# Patient Record
Sex: MALE | Race: WHITE | Employment: STUDENT
[De-identification: names, ages, dates, MRNs, and addresses within clinical notes are randomized per-mention and may not be internally consistent; named-entity substitution may affect disease eponyms.]

---

## 2018-05-07 ENCOUNTER — HEALTH MAINTENANCE LETTER (OUTPATIENT)
Age: 23
End: 2018-05-07

## 2018-05-28 ENCOUNTER — HEALTH MAINTENANCE LETTER (OUTPATIENT)
Age: 23
End: 2018-05-28

## 2019-05-22 ENCOUNTER — TRANSFERRED RECORDS (OUTPATIENT)
Dept: HEALTH INFORMATION MANAGEMENT | Facility: CLINIC | Age: 24
End: 2019-05-22

## 2019-06-17 NOTE — PATIENT INSTRUCTIONS
FREE AND CONFIDENTIAL 24/7 MENTAL HEALTH OR CRISIS HOTLINES:  BY COUNTY you live in:      [place]    Adult  [phone]                     Child   [phone]      [facility] [facility] [phone]                                             Child [phone]    [place]             Adult [phone]                                            Child [phone]        Preventive Health Recommendations  Male Ages 21 - 25     Yearly exam:             See your health care provider every year in order to  o   Review health changes.   o   Discuss preventive care.    o   Review your medicines if your doctor has prescribed any.    You should be tested each year for STDs (sexually transmitted diseases).     Talk to your provider about cholesterol testing.      If you are at risk for diabetes, you should have a diabetes test (fasting glucose).    Shots: Get a flu shot each year. Get a tetanus shot every 10 years.     Nutrition:    Eat at least 5 servings of fruits and vegetables daily.     Eat whole-grain bread, whole-wheat pasta and brown rice instead of white grains and rice.     Get adequate calcium and Vitamin D.     Lifestyle    Exercise for at least 150 minutes a week (30 minutes a day, 5 days a week). This will help you control your weight and prevent disease.     Limit alcohol to one drink per day.     No smoking.     Wear sunscreen to prevent skin cancer.     See your dentist every six months for an exam and cleaning.

## 2019-06-17 NOTE — PROGRESS NOTES
Subjective     Curly Leslie is a 24 year old male who presents to clinic today for the following health issues:    HPI     New Patient/Transfer of Care    Discuss mental health issue and establish care, would like to have meds check and taken over. Pt was @ [facility] in [place].      Has been taking zoloft 150 mg, propanolol 60 mg (recently increased from 40 mg), and ativan 0.5 mg[place] of the ativan in October and June 3rd of this year. No concerns seen.     Missed his medications for awhile. Went through a bad breakup recently so scores are higher per patient. Before this breakup he feels the medications were helping.  No side effects.   He has been on 150 mg zoloft only recently not enough time for it to kick in.   Denies suicidal or homicidal thoughts.  Patient instructed to go to the emergency room or call 911 if these occur.  He has had thoughts of being better off not here per patient but no thoughts of actually harming himself per patient. No active plan either.     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 Moving back here.   Is job hunting. Lives with parents.   Was diagnosed with anxiety about a year ago and also depression.  Saw a therapist briefly. Would like to establish here.                   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      Chua's nevus     Acne vulgaris     Mood disorder (H)     Anxiety     BMI 35.0-35.9,adult     Past Surgical History:   Procedure Laterality Date     NO HISTORY OF SURGERY         Social History     Tobacco Use     Smoking status: Never Smoker     Smokeless tobacco: Never Used     Tobacco comment: no smoker in the household   Substance Use Topics     Alcohol use: No     Family History   Problem Relation Age of Onset     Diabetes Mother      Diabetes Maternal Grandmother      Hypertension Maternal Grandmother       "Diabetes Maternal Grandfather      Hypertension Maternal Grandfather          Current Outpatient Medications   Medication Sig Dispense Refill     LORazepam (ATIVAN) 0.5 MG tablet TAKE 1 TAB BY MOUTH DAILY AS NEEDED FOR SEVERE ANXIETY  0     propranolol (INDERAL) 60 MG tablet TAKE 1 TAB BY MOUTH TWICE DAILY AS NEEDED FOR ANXIETY.  1     sertraline (ZOLOFT) 100 MG tablet TAKE 1.5 TABLETS BY MOUTH 1 TIME A DAY FOR ANXIETY.  1     Allergies   Allergen Reactions     Nkda [No Known Drug Allergies]          Reviewed and updated as needed this visit by Provider         Review of Systems   ROS COMP: Constitutional, HEENT, cardiovascular, pulmonary, GI, , musculoskeletal, neuro, skin, endocrine and psych systems are negative, except as otherwise noted.      Objective    /86   Pulse 88   Temp 98.9  F (37.2  C) (Oral)   Resp 16   Ht 1.778 m (5' 10\")   Wt 113.4 kg (250 lb)   SpO2 100%   BMI 35.87 kg/m    Body mass index is 35.87 kg/m .  Physical Exam   GENERAL: alert, no distress and obese  NECK: no adenopathy, no asymmetry, masses, or scars and thyroid normal to palpation  RESP: lungs clear to auscultation - no rales, rhonchi or wheezes  CV: regular rate and rhythm, normal S1 S2, no S3 or S4, no murmur, click or rub, no peripheral edema and peripheral pulses strong  MS: no gross musculoskeletal defects noted, no edema  NEURO: Normal strength and tone, mentation intact and speech normal  PSYCH: mentation appears normal, affect normal/bright            Assessment & Plan     1. Mood disorder (H)  Worsening, needs to establish with therapist which he would like, to help him through this life change  Is already on higher zoloft dose, will give this more time  Uses ativan appropriately/rarely  Doesn't need any refills today. Will come back in before needing more refills of ativan   if doesn't improve over the next 1-2 months consider wellbutrin and psych referral    - MENTAL HEALTH REFERRAL  - Adult; Outpatient " Treatment; Individual/Couples/Family/Group Therapy/Health Psychology[place] [facility] [place] [facility] [phone]; We will contact you to schedule the appointment or please call with any questions    2. Anxiety    - MENTAL HEALTH REFERRAL  - Adult; Outpatient Treatment; Individual/Couples/Family/Group Therapy/Health Psychology[place] [facility] [place] [facility] [phone]; We will contact you to schedule the appointment or please call with any questions    3. BMI 35.0-35.9,adult  Will discuss more at next visit           Return in about 4 weeks (around 7/22/2019) for with my chart message.    Carly Junior PA-C  [facility]

## 2019-06-24 ENCOUNTER — OFFICE VISIT (OUTPATIENT)
Dept: FAMILY MEDICINE | Facility: CLINIC | Age: 24
End: 2019-06-24
Payer: COMMERCIAL

## 2019-06-24 VITALS
SYSTOLIC BLOOD PRESSURE: 131 MMHG | HEART RATE: 88 BPM | RESPIRATION RATE: 16 BRPM | DIASTOLIC BLOOD PRESSURE: 86 MMHG | BODY MASS INDEX: 35.79 KG/M2 | TEMPERATURE: 98.9 F | WEIGHT: 250 LBS | HEIGHT: 70 IN | OXYGEN SATURATION: 100 %

## 2019-06-24 DIAGNOSIS — F41.9 ANXIETY: ICD-10-CM

## 2019-06-24 DIAGNOSIS — F39 MOOD DISORDER (H): Primary | ICD-10-CM

## 2019-06-24 PROCEDURE — 99214 OFFICE O/P EST MOD 30 MIN: CPT | Performed by: PHYSICIAN ASSISTANT

## 2019-06-24 RX ORDER — SERTRALINE HYDROCHLORIDE 100 MG/1
TABLET, FILM COATED ORAL
Refills: 1 | COMMUNITY
Start: 2019-05-22

## 2019-06-24 RX ORDER — PROPRANOLOL HYDROCHLORIDE 60 MG/1
TABLET ORAL
Refills: 1 | COMMUNITY
Start: 2019-05-22

## 2019-06-24 RX ORDER — LORAZEPAM 0.5 MG/1
TABLET ORAL
Refills: 0 | COMMUNITY
Start: 2019-06-03

## 2019-06-24 ASSESSMENT — ANXIETY QUESTIONNAIRES
IF YOU CHECKED OFF ANY PROBLEMS ON THIS QUESTIONNAIRE, HOW DIFFICULT HAVE THESE PROBLEMS MADE IT FOR YOU TO DO YOUR WORK, TAKE CARE OF THINGS AT HOME, OR GET ALONG WITH OTHER PEOPLE: VERY DIFFICULT
2. NOT BEING ABLE TO STOP OR CONTROL WORRYING: NEARLY EVERY DAY
3. WORRYING TOO MUCH ABOUT DIFFERENT THINGS: NEARLY EVERY DAY
6. BECOMING EASILY ANNOYED OR IRRITABLE: MORE THAN HALF THE DAYS
GAD7 TOTAL SCORE: 17
5. BEING SO RESTLESS THAT IT IS HARD TO SIT STILL: SEVERAL DAYS
7. FEELING AFRAID AS IF SOMETHING AWFUL MIGHT HAPPEN: NEARLY EVERY DAY
1. FEELING NERVOUS, ANXIOUS, OR ON EDGE: NEARLY EVERY DAY

## 2019-06-24 ASSESSMENT — MIFFLIN-ST. JEOR: SCORE: 2140.24

## 2019-06-24 ASSESSMENT — PATIENT HEALTH QUESTIONNAIRE - PHQ9: 5. POOR APPETITE OR OVEREATING: MORE THAN HALF THE DAYS

## 2019-06-24 NOTE — LETTER
My Depression Action Plan  Name: Curly Leslie   Date of Birth 1995  Date: 6/24/2019    My doctor: Sesar Lewis   My clinic: [address] [address] [phone]          GREEN    ZONE   Good Control    What it looks like:     Things are going generally well. You have normal up s and down s. You may even feel depressed from time to time, but bad moods usually last less than a day.   What you need to do:  1. Continue to care for yourself (see self care plan)  2. Check your depression survival kit and update it as needed  3. Follow your physician s recommendations including any medication.  4. Do not stop taking medication unless you consult with your physician first.           YELLOW         ZONE Getting Worse    What it looks like:     Depression is starting to interfere with your life.     It may be hard to get out of bed; you may be starting to isolate yourself from others.    Symptoms of depression are starting to last most all day and this has happened for several days.     You may have suicidal thoughts but they are not constant.   What you need to do:     1. Call your care team, your response to treatment will improve if you keep your care team informed of your progress. Yellow periods are signs an adjustment may need to be made.     2. Continue your self-care, even if you have to fake it!    3. Talk to someone in your support network    4. Open up your depression survival kit           RED    ZONE Medical Alert - Get Help    What it looks like:     Depression is seriously interfering with your life.     You may experience these or other symptoms: You can t get out of bed most days, can t work or engage in other necessary activities, you have trouble taking care of basic hygiene, or basic responsibilities, thoughts of suicide or death that will not go away, self-injurious behavior.     What you need to do:  1. Call your care team and  request a same-day appointment. If they are not available (weekends or after hours) call your local crisis line, emergency room or 911.            Depression Self Care Plan / Survival Kit    Self-Care for Depression  Here s the deal. Your body and mind are really not as separate as most people think.  What you do and think affects how you feel and how you feel influences what you do and think. This means if you do things that people who feel good do, it will help you feel better.  Sometimes this is all it takes.  There is also a place for medication and therapy depending on how severe your depression is, so be sure to consult with your medical provider and/ or Behavioral Health Consultant if your symptoms are worsening or not improving.     In order to better manage my stress, I will:    Exercise  Get some form of exercise, every day. This will help reduce pain and release endorphins, the  feel good  chemicals in your brain. This is almost as good as taking antidepressants!  This is not the same as joining a gym and then never going! (they count on that by the way ) It can be as simple as just going for a walk or doing some gardening, anything that will get you moving.      Hygiene   Maintain good hygiene (Get out of bed in the morning, Make your bed, Brush your teeth, Take a shower, and Get dressed like you were going to work, even if you are unemployed).  If your clothes don't fit try to get ones that do.    Diet  I will strive to eat foods that are good for me, drink plenty of water, and avoid excessive sugar, caffeine, alcohol, and other mood-altering substances.  Some foods that are helpful in depression are: complex carbohydrates, B vitamins, flaxseed, fish or fish oil, fresh fruits and vegetables.    Psychotherapy  I agree to participate in Individual Therapy (if recommended).    Medication  If prescribed medications, I agree to take them.  Missing doses can result in serious side effects.  I understand that  drinking alcohol, or other illicit drug use, may cause potential side effects.  I will not stop my medication abruptly without first discussing it with my provider.    Staying Connected With Others  I will stay in touch with my friends, family members, and my primary care provider/team.    Use your imagination  Be creative.  We all have a creative side; it doesn t matter if it s oil painting, sand castles, or mud pies! This will also kick up the endorphins.    Witness Beauty  (AKA stop and smell the roses) Take a look outside, even in mid-winter. Notice colors, textures. Watch the squirrels and birds.     Service to others  Be of service to others.  There is always someone else in need.  By helping others we can  get out of ourselves  and remember the really important things.  This also provides opportunities for practicing all the other parts of the program.    Humor  Laugh and be silly!  Adjust your TV habits for less news and crime-drama and more comedy.    Control your stress  Try breathing deep, massage therapy, biofeedback, and meditation. Find time to relax each day.     My support system    Clinic Contact:  Phone number:    Contact 1:  Phone number:    Contact 2:  Phone number:    [employer]/:  Phone number:    Therapist:  Phone number:    Local crisis center:    Phone number:    Other community support:  Phone number:

## 2019-06-25 ASSESSMENT — ANXIETY QUESTIONNAIRES: GAD7 TOTAL SCORE: 17

## 2019-07-11 ENCOUNTER — OFFICE VISIT (OUTPATIENT)
Dept: BEHAVIORAL HEALTH | Facility: CLINIC | Age: 24
End: 2019-07-11
Attending: PHYSICIAN ASSISTANT
Payer: COMMERCIAL

## 2019-07-11 DIAGNOSIS — F32.1 MAJOR DEPRESSIVE DISORDER, SINGLE EPISODE, MODERATE (H): Primary | ICD-10-CM

## 2019-07-11 DIAGNOSIS — F41.1 GENERALIZED ANXIETY DISORDER: ICD-10-CM

## 2019-07-11 PROCEDURE — 90791 PSYCH DIAGNOSTIC EVALUATION: CPT | Performed by: MARRIAGE & FAMILY THERAPIST

## 2019-07-16 NOTE — PROGRESS NOTES
"[facility]   Integrated Behavioral Health Services   Diagnostic Assessment      PATIENT'S NAME: Curly Leslie  MRN:   [id number]  :   1995  DATE OF SERVICE: 2019  SERVICE LOCATION: Face to Face in Clinic  Visit Activities: NEW      Identifying Information:  Patient is a 24 year old year old, , single male.  Patient attended the session alone.        Referral:  Patient was referred for an assessment by PCP at [facility].   Reason for referral: clarify behavioral health diagnosis, determine behavioral health treatment options, assess client readiness and motivation to change and assess client social situation.       Patient's Statement of Presenting Concern:  Patient reports the following reason(s) for seeking an assessment at this time: Patient reports depression and anxiety symptoms which increased after recent break up with girlfriend who he was living with in [place], [place].      Patient stated that his symptoms have resulted in the following functional impairments: academic performance, relationship(s), social interactions and work / vocational responsibilities      History of Presenting Concern:  Patient reports that these problem(s) began 1 year ago. Patient has attempted to resolve these concerns in the past through: psychiatry and 2-3 sessions of therapy counseling through [facility] in [place], [place]. Patient reports that other professional(s) are involved in providing support / services/ Medication management through PCP.       Social History:  Patient reported he grew up in [place], [place]. They were the only child born to his biological parents (Alireza and Johanna). Patient reported that his childhood was \"isolated\" spent a lot of time by himself. Patient reports parents have significant marital problems, patient feels \"pressure\" regarding his parents marital issues. Patient reports both parents also have depression issues, and " Chemical Dependency/unhealthy substance use issues with alcohol and cannabis.     Patient reported a history of one committed relationship. Patient was dating girlfriend Lisha 23yr old for 1 year; he moved in with her in June 2019 after he completed college this spring. Patient reports girlfriend ended the relationship after a few days/ week after he moved in with her. Patient reported having no children. Patient identified few stable and meaningful social connections.     Patient lives with parents, as he had to move back in with parents after his relationship with jean marie ended .  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      Patient reported that he has not been involved with the legal system. Patient's highest education level was some college; patient has 2 classes to complete by October 2019 to earn his bachelor's degree; as he had taken a medical leave due to depression and anxiety from classes his last semester. Patient did not identify any learning problems. There are no ethnic, cultural or Oriental orthodox factors that may be relevant for therapy.  Patient did not serve in the .       Mental Health History:  Patient reported the following biological family members or relatives with mental health issues: Father experienced Depression, Mother experienced Depression, maternal great uncle- PTSD/ war veter[place] and completed suicide, maternal cousins with depression, anxiety and one was hospitalized for suicide attempt. Patient has received the following mental health services in the past: counseling and medication(s) from physician / PCP. Patient is currently receiving the following services: medication(s) from physician / PCP.      Chemical Health History:  Patient reported the following biological family members or relatives with chemical health issues: Father reportedly uses alcohol , Mother reportedly  uses alcohol  and cannabis . Patient has not received chemical dependency treatment in the past. Patient is not currently receiving any chemical dependency treatment. Patient reports no problems as a result of their drinking / drug use. Patient reports occasional alcohol use. Patient reports chronic cannabis use while in college living with house mates. Patient reports since moving back in with parents, decrease of cannabis use.      Cage-AID score is: negative Based on Cage-Aid score and clinical interview there  are not indications of drug or alcohol abuse. Continue to monitor and address concerns.      Discussed the general effects of drugs and alcohol on health and well-being.      Significant Losses / Trauma / Abuse / Neglect Issues:  There are indications or report of significant loss, trauma, abuse or neglect issues related to: death of maternal grandfather 2 years ago due to cancer and relationship chnage/ending- break up with girlfirend .    Issues of possible neglect are not present.      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        Patient was provided recommendation to follow-up with physician.    Mental Status Assessment:  Appearance:   Appropriate   Eye Contact:   Good   Psychomotor Behavior: Normal   Attitude:   Cooperative   Orientation:   All  Speech   Rate / Production: Normal    Volume:  Normal   Mood:    Anxious  Depressed  Sad   Affect:    Subdued  Worrisome   Thought Content:  Rumination   Thought Form:  Coherent  Logical   Insight:    Good       Safety  "Assessment:    Patient denies a history of suicidal ideation, suicide attempts, self-injurious behavior, homicidal ideation, homicidal behavior and and other safety concerns  Patient reports the following current or recent suicidal ideation or behaviors: passive thoughts, No plan, no intent to take action, no history of attempts. SI related to recent break up with girlfriend \"I don't want to be around\".  Patient denies current or recent homicidal ideation or behaviors.  Patient denies current or recent self injurious behavior or ideation.  Patient denies other safety concerns.  Patient reports there are firearms in the house. The firearms are secured in a locked space  Protective Factors Sense of responsibility to family, Reality testing ability, Positive coping skills, Positive problem-solving skills, Positive social support and Positive therapeutic releationships   Risk Factors Current high stress      Plan for Safety and Risk Management:  A safety and risk management plan has not been developed at this time, however patient was encouraged to call [place] [phone] should there be a change in any of these risk factors.      Review of Symptoms:  Depression: Sleep Interest Guilt Energy Concentration Psychomotor slowing or agitation Suicide Hopeless Helpless Worthless Ruminations Irritability  Syl:  No symptoms  Psychosis: No symptoms  Anxiety: Worries Nervousness Usual  Panic:  No symptoms  Post Traumatic Stress Disorder: No symptoms  Obsessive Compulsive Disorder: No symptoms  Eating Disorder: No symptoms  Oppositional Defiant Disorder: No symptoms  ADD / ADHD: No symptoms  Conduct Disorder: No symptoms    Patient's Strengths and Limitations:  Patient identified the following strengths or resources that will help him succeed in counseling: commitment to health and well being, friends / good social support and family support. Patient identified the following supports: family and friends. Things that may " interfere with the patien'ts success in behavioral health services include:financial hardship.    Diagnostic Criteria:  A. Excessive anxiety and worry about a number of events or activities (such as work or school performance).   B. The person finds it difficult to control the worry.  C. Select 3 or more symptoms (required for diagnosis). Only one item is required in children.   - Restlessness or feeling keyed up or on edge.    - Being easily fatigued.    - Difficulty concentrating or mind going blank.    - Irritability.    - Muscle tension.    - Sleep disturbance (difficulty falling or staying asleep, or restless unsatisfying sleep).   D. The focus of the anxiety and worry is not confined to features of an Axis I disorder.  E. The anxiety, worry, or physical symptoms cause clinically significant distress or impairment in social, occupational, or other important areas of functioning.   F. The disturbance is not due to the direct physiological effects of a substance (e.g., a drug of abuse, a medication) or a general medical condition (e.g., hyperthyroidism) and does not occur exclusively during a Mood Disorder, a Psychotic Disorder, or a Pervasive Developmental Disorder.    - The aformentioned symptoms began 1 year(s) ago and occurs 5 days per week and is experienced as moderate.  A) Single episode - symptoms have been present during the same 2-week period and represent a change from previous functioning 5 or more symptoms (required for diagnosis)   - Depressed mood. Note: In children and adolescents, can be irritable mood.     - Diminished interest or pleasure in all, or almost all, activities.    - Increased sleep.    - Psychomotor activity retardation.    - Fatigue or loss of energy.    - Feelings of worthlessness or inappropriate and excessive guilt.    - Diminished ability to think or concentrate, or indecisiveness.    - Recurrent thoughts of death (not just fear of dying), recurrent suicidal ideation without a  specific plan, or a suicide attempt or a specific plan for committing suicide.   B) The symptoms cause clinically significant distress or impairment in social, occupational, or other important areas of functioning  C) The episode is not attributable to the physiological effects of a substance or to another medical condition  D) The occurence of major depressive episode is not better explained by other thought / psychotic disorders  E) There has never been a manic episode or hypomanic episode      Functional Status:  Patient's symptoms have caused reduced functional status in the following areas: Academics / Education - Impacts to college courses  Occupational / Vocational - Imapcts to work/ employment   Social / Relational - Impacts to social and family interactions, and relationship changes      DSM5 Diagnoses: (Sustained by DSM5 Criteria Listed Above)  Diagnoses: 296.22 (F32.1)  Major Depressive Disorder, Single Episode, Moderate With anxious distress  300.02 (F41.1) Generalized Anxiety Disorder  Psychosocial & Contextual Factors: Recent relationship change   WHODAS Score: 24  See Media section of EPIC medical record for completed WHODAS    Preliminary Treatment Plan:    The client reports no currently identified Church, ethnic or cultural issues relevant to therapy.    Initial Treatment will focus on: Depressed Mood - Decrease symptoms and increase effective and healthy coping skills   Anxiety - Decrease symptoms and increase effective and healthy coping skills .    Chemical dependency recommendations: No indications of CD issues    As a preliminary treatment goal, patient will experience a reduction in depressed mood, will develop more effective coping skills to manage depressive symptoms, will develop healthy cognitive patterns and beliefs, will increase ability to function adaptively and will continue to take medications as prescribed / participate in supportive activities and services  and will experience  a reduction in anxiety, will develop more effective coping skills to manage anxiety symptoms, will develop healthy cognitive patterns and beliefs and will increase ability to function adaptively.    The focus of initial interventions will be to alleviate anxiety, alleviate depressed mood, facilitate appropriate expression of feelings, increase ability to function adaptively, increase coping skills, increase self esteem, process losses, teach CBT skills, teach distress tolerance skills, teach emotional regulation, teach mindfulness skills, teach relaxation strategies and teach stress mangement techniques.    Collaboration with other professionals is not indicated at this time.    Referral to another professional/service is not indicated at this time..    A Release of Information is not needed at this time.    Report to child or adult protection services was NA.    Mahnaz Luis, Behavioral Health Clinician

## 2019-07-22 ENCOUNTER — OFFICE VISIT (OUTPATIENT)
Dept: BEHAVIORAL HEALTH | Facility: CLINIC | Age: 24
End: 2019-07-22
Payer: COMMERCIAL

## 2019-07-22 DIAGNOSIS — F41.1 GENERALIZED ANXIETY DISORDER: ICD-10-CM

## 2019-07-22 DIAGNOSIS — F32.1 MAJOR DEPRESSIVE DISORDER, SINGLE EPISODE, MODERATE (H): Primary | ICD-10-CM

## 2019-07-22 PROCEDURE — 90832 PSYTX W PT 30 MINUTES: CPT | Performed by: MARRIAGE & FAMILY THERAPIST

## 2019-07-22 ASSESSMENT — PATIENT HEALTH QUESTIONNAIRE - PHQ9
5. POOR APPETITE OR OVEREATING: MORE THAN HALF THE DAYS
SUM OF ALL RESPONSES TO PHQ QUESTIONS 1-9: 17

## 2019-07-22 ASSESSMENT — ANXIETY QUESTIONNAIRES
7. FEELING AFRAID AS IF SOMETHING AWFUL MIGHT HAPPEN: NEARLY EVERY DAY
IF YOU CHECKED OFF ANY PROBLEMS ON THIS QUESTIONNAIRE, HOW DIFFICULT HAVE THESE PROBLEMS MADE IT FOR YOU TO DO YOUR WORK, TAKE CARE OF THINGS AT HOME, OR GET ALONG WITH OTHER PEOPLE: EXTREMELY DIFFICULT
GAD7 TOTAL SCORE: 18
1. FEELING NERVOUS, ANXIOUS, OR ON EDGE: NEARLY EVERY DAY
5. BEING SO RESTLESS THAT IT IS HARD TO SIT STILL: SEVERAL DAYS
6. BECOMING EASILY ANNOYED OR IRRITABLE: NEARLY EVERY DAY
3. WORRYING TOO MUCH ABOUT DIFFERENT THINGS: NEARLY EVERY DAY
2. NOT BEING ABLE TO STOP OR CONTROL WORRYING: NEARLY EVERY DAY

## 2019-07-22 NOTE — PROGRESS NOTES
[facility] [place]   July 22, 2019      Behavioral Health Clinician Progress Note    Patient Name: Curly Leslie           Service Type:  Individual      Service Location:   Face to Face in Clinic     Session Start Time: 1:30  Session End Time: 2:00      Session Length: 16 - 37      Attendees: Patient    Visit Activities (Refresh list every visit): [facility] Only    Diagnostic Assessment Date: 7/11/19  Treatment Plan Review Date: To be completed   See Flowsheets for today's PHQ-9 and ANN-7 results  Previous PHQ-9:   PHQ-9 SCORE 6/24/2019 7/22/2019   PHQ-9 Total Score 16 17     Previous ANN-7:   ANN-7 SCORE 6/24/2019 7/22/2019   Total Score 17 18       MÓNICA LEVEL:  MÓNICA Score (Last Two) 6/24/2019 7/22/2019   MÓNICA Raw Score 40 31   Activation Score 100 59.3   MÓNICA Level 4 3       DATA  Extended Session (60+ minutes): No  Interactive Complexity: No  Crisis: No  [facility] Patient: No    Treatment Objective(s) Addressed in This Session:  Target Behavior(s): disease management/lifestyle changes related to anxiety and depression    Depressed Mood: Increase interest, engagement, and pleasure in doing things  Decrease frequency and intensity of feeling down, depressed, hopeless  Improve quantity and quality of night time sleep / decrease daytime naps  Feel less tired and more energy during the day   Improve diet, appetite, mindful eating, and / or meal planning  Identify negative self-talk and behaviors: challenge core beliefs, myths, and actions  Improve concentration, focus, and mindfulness in daily activities   Feel less fidgety, restless or slow in daily activities / interpersonal interactions  Decrease thoughts that you'd be better off dead or of suicide / self-harm  Anxiety: will experience a reduction in anxiety, will develop more effective coping skills to manage anxiety symptoms, will develop healthy cognitive patterns and beliefs and will increase ability to function adaptively    Current Stressors /  Issues:  Patient starts first day of work today at [employer].   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  Patient processed his priority goals; working, financial independence, moving out of parents' house, finishing his college degree       Progress on Treatment Objective(s) / Homework:  New Objective established this session - ACTION (Actively working towards change); Intervened by reinforcing change plan / affirming steps taken     Cognitive Behavioral Therapy  -Self Esteem Journal     Motivational Interviewing    MI Intervention: Co-Developed Goal: Financial independnece, own housing, work/ college degree, manage depression and anxiety, Expressed Empathy/Understanding, Open-ended questions and Reflections: simple and complex     Change Talk Expressed by the Patient: Desire to change Ability to change Reasons to change Need to change    Provider Response to Change Talk: E - Evoked more info from patient about behavior change, A - Affirmed patient's thoughts, decisions, or attempts at behavior change, R - Reflected patient's change talk and S - Summarized patient's change talk statements    Also provided psychoeducation about behavioral health condition, symptoms, and treatment options    Care Plan review completed: Yes    Medication Review:  No changes to current psychiatric medication(s)    Medication Compliance:  Yes    Changes in Health Issues:   None reported    Chemical Use Review:   Substance Use: Chemical use reviewed, no active concerns identified      Tobacco Use: No current tobacco use.      Assessment: Current Emotional / Mental Status (status of significant symptoms):  Risk status (Self / Other harm or suicidal ideation)  Patient denies a history of suicidal ideation, suicide attempts, self-injurious behavior, homicidal ideation, homicidal behavior and and other safety concerns  Patient denies current fears or concerns for personal safety.  Patient reports the  "following current or recent suicidal ideation or behaviors: passive thoughts, No plan, no intent to take action, no history of attempts. SI related to recent break up with girlfriend \"I don't want to be around\".  Patient denies current or recent homicidal ideation or behaviors.  Patient denies current or recent self injurious behavior or ideation.  Patient denies other safety concerns.  A safety and risk management plan has not been developed at this time, however patient was encouraged to call [place] [phone] should there be a change in any of these risk factors.    Appearance:   Appropriate   Eye Contact:   Good   Psychomotor Behavior: Normal   Attitude:   Cooperative   Orientation:   All  Speech   Rate / Production: Normal    Volume:  Normal   Mood:    Anxious  Depressed   Affect:    Worrisome   Thought Content:  Rumination   Thought Form:  Coherent  Logical   Insight:    Good     Diagnoses:  1. Major depressive disorder, single episode, moderate (H)    2. Generalized anxiety disorder        Collateral Reports Completed:  Not Applicable    Plan: (Homework, other):  Patient was given information about behavioral services and encouraged to schedule a follow up appointment with the clinic [facility] 8/1/19.  He was also given Cognitive Behavioral Therapy skills to practice when experiencing anxiety and depression.  CD Recommendations: No indications of CD issues.  GOSIA Veras, [facility]      "

## 2019-07-23 ASSESSMENT — ANXIETY QUESTIONNAIRES: GAD7 TOTAL SCORE: 18

## 2019-08-01 ENCOUNTER — OFFICE VISIT (OUTPATIENT)
Dept: BEHAVIORAL HEALTH | Facility: CLINIC | Age: 24
End: 2019-08-01
Payer: COMMERCIAL

## 2019-08-01 DIAGNOSIS — F32.1 MAJOR DEPRESSIVE DISORDER, SINGLE EPISODE, MODERATE (H): Primary | ICD-10-CM

## 2019-08-01 DIAGNOSIS — F41.1 GENERALIZED ANXIETY DISORDER: ICD-10-CM

## 2019-08-01 PROCEDURE — 90832 PSYTX W PT 30 MINUTES: CPT | Performed by: MARRIAGE & FAMILY THERAPIST

## 2019-08-02 NOTE — PROGRESS NOTES
[facility] [place]      Behavioral Health Clinician Progress Note    Patient Name: Curly Leslie           Service Type:  Individual      Service Location:   Face to Face in Clinic     Session Start Time: 12:00  Session End Time: 12:30      Session Length: 16 - 37      Attendees: Patient    Visit Activities (Refresh list every visit): [facility] Only    Diagnostic Assessment Date: 7/11/19  Treatment Plan Review Date: To be completed   See Flowsheets for today's PHQ-9 and ANN-7 results  Previous PHQ-9:   PHQ-9 SCORE 6/24/2019 7/22/2019   PHQ-9 Total Score 16 17     Previous ANN-7:   ANN-7 SCORE 6/24/2019 7/22/2019   Total Score 17 18       MÓNICA LEVEL:  MÓNICA Score (Last Two) 6/24/2019 7/22/2019   MÓNICA Raw Score 40 31   Activation Score 100 59.3   MÓNICA Level 4 3       DATA  Extended Session (60+ minutes): No  Interactive Complexity: No  Crisis: No  [facility] Patient: No    Treatment Objective(s) Addressed in This Session:  Target Behavior(s): disease management/lifestyle changes related to anxiety and depression    Depressed Mood: Increase interest, engagement, and pleasure in doing things  Decrease frequency and intensity of feeling down, depressed, hopeless  Improve quantity and quality of night time sleep / decrease daytime naps  Feel less tired and more energy during the day   Improve diet, appetite, mindful eating, and / or meal planning  Identify negative self-talk and behaviors: challenge core beliefs, myths, and actions  Improve concentration, focus, and mindfulness in daily activities   Feel less fidgety, restless or slow in daily activities / interpersonal interactions  Decrease thoughts that you'd be better off dead or of suicide / self-harm  Anxiety: will experience a reduction in anxiety, will develop more effective coping skills to manage anxiety symptoms, will develop healthy cognitive patterns and beliefs and will increase ability to function adaptively    Current Stressors /  Issues:  Patient reports new job at [employer] is going well.   Patient reports biggest concern and distress is emotional reactions to ruminating thoughts about ex-girlfriend and loss of relationship.       Progress on Treatment Objective(s) / Homework:  New Objective established this session - ACTION (Actively working towards change); Intervened by reinforcing change plan / affirming steps taken     Cognitive Behavioral Therapy  -Behavior Activation wok sheet (college course work completion)  -Midnfulness exercises / healthy Distraction exercises to combat distressing and ruminating thoughts about ex-girlfriend   -Grief/Loss process regarding break up/ loss of relationship       Motivational Interviewing    MI Intervention: Co-Developed Goal: Financial independnece, own housing, work/ college degree, manage depression and anxiety, Expressed Empathy/Understanding, Open-ended questions and Reflections: simple and complex     Change Talk Expressed by the Patient: Desire to change Ability to change Reasons to change Need to change    Provider Response to Change Talk: E - Evoked more info from patient about behavior change, A - Affirmed patient's thoughts, decisions, or attempts at behavior change, R - Reflected patient's change talk and S - Summarized patient's change talk statements    Also provided psychoeducation about behavioral health condition, symptoms, and treatment options    Care Plan review completed: Yes    Medication Review:  No changes to current psychiatric medication(s)    Medication Compliance:  Yes    Changes in Health Issues:   None reported    Chemical Use Review:   Substance Use: Chemical use reviewed, no active concerns identified      Tobacco Use: No current tobacco use.      Assessment: Current Emotional / Mental Status (status of significant symptoms):  Risk status (Self / Other harm or suicidal ideation)  Patient denies a history of suicidal ideation, suicide attempts, self-injurious behavior,  "homicidal ideation, homicidal behavior and and other safety concerns  Patient denies current fears or concerns for personal safety.  Patient reports the following current or recent suicidal ideation or behaviors: passive thoughts, No plan, no intent to take action, no history of attempts. SI related to recent break up with girlfriend \"I don't want to be around\".  Patient denies current or recent homicidal ideation or behaviors.  Patient denies current or recent self injurious behavior or ideation.  Patient denies other safety concerns.  A safety and risk management plan has not been developed at this time, however patient was encouraged to call [place] [phone] should there be a change in any of these risk factors.    Appearance:   Appropriate   Eye Contact:   Good   Psychomotor Behavior: Normal   Attitude:   Cooperative   Orientation:   All  Speech   Rate / Production: Normal    Volume:  Normal   Mood:    Anxious  Depressed   Affect:    Worrisome   Thought Content:  Rumination   Thought Form:  Coherent  Logical   Insight:    Good     Diagnoses:  1. Major depressive disorder, single episode, moderate (H)    2. Generalized anxiety disorder        Collateral Reports Completed:  Not Applicable    Plan: (Homework, other):  Patient was given information about behavioral services and encouraged to schedule a follow up appointment with the clinic [facility] 8/15/19, 8/22/19, ongoing care/transfer to [facility].  He was also given Cognitive Behavioral Therapy skills to practice when experiencing anxiety and depression.  CD Recommendations: No indications of CD issues.  GOSIA Veras, [facility]    "

## 2019-08-15 ENCOUNTER — OFFICE VISIT (OUTPATIENT)
Dept: BEHAVIORAL HEALTH | Facility: CLINIC | Age: 24
End: 2019-08-15
Payer: COMMERCIAL

## 2019-08-15 DIAGNOSIS — F41.1 GENERALIZED ANXIETY DISORDER: ICD-10-CM

## 2019-08-15 DIAGNOSIS — F32.1 MAJOR DEPRESSIVE DISORDER, SINGLE EPISODE, MODERATE (H): Primary | ICD-10-CM

## 2019-08-15 PROCEDURE — 90832 PSYTX W PT 30 MINUTES: CPT | Performed by: MARRIAGE & FAMILY THERAPIST

## 2019-08-15 NOTE — PROGRESS NOTES
[facility] [place] [facility]   August 15, 2019      Behavioral Health Clinician Progress Note    Patient Name: Curly Leslie           Service Type:  Individual      Service Location:   Face to Face in Clinic     Session Start Time: 12:00  Session End Time: 12:30      Session Length: 16 - 37      Attendees: Patient    Visit Activities (Refresh list every visit): [facility] Only    Diagnostic Assessment Date: 7/11/19  Treatment Plan Review Date: To be completed   See Flowsheets for today's PHQ-9 and ANN-7 results  Previous PHQ-9:   PHQ-9 SCORE 6/24/2019 7/22/2019   PHQ-9 Total Score 16 17     Previous ANN-7:   ANN-7 SCORE 6/24/2019 7/22/2019   Total Score 17 18       MÓNICA LEVEL:  MÓNICA Score (Last Two) 6/24/2019 7/22/2019   MÓNICA Raw Score 40 31   Activation Score 100 59.3   MÓNICA Level 4 3       DATA  Extended Session (60+ minutes): No  Interactive Complexity: No  Crisis: No  [facility] Patient: No    Treatment Objective(s) Addressed in This Session:  Target Behavior(s): disease management/lifestyle changes related to anxiety and depression    Depressed Mood: Increase interest, engagement, and pleasure in doing things  Decrease frequency and intensity of feeling down, depressed, hopeless  Improve quantity and quality of night time sleep / decrease daytime naps  Feel less tired and more energy during the day   Improve diet, appetite, mindful eating, and / or meal planning  Identify negative self-talk and behaviors: challenge core beliefs, myths, and actions  Improve concentration, focus, and mindfulness in daily activities   Feel less fidgety, restless or slow in daily activities / interpersonal interactions  Decrease thoughts that you'd be better off dead or of suicide / self-harm  Anxiety: will experience a reduction in anxiety, will develop more effective coping skills to manage anxiety symptoms, will develop healthy cognitive patterns and beliefs and will increase ability to function adaptively    Current Stressors /  "Issues:  Patient reports increased symptoms of depression and anxiety. Patient reports unhappy, feeling hopeless and dissatisfied with how his \"life\" has turned out currently.   Patient denies suicide ideation.   Patient processed his dislike of living in his parents home; and his goal to get his own housing/ he would like to move out [place]/ [place]/ [place]/ [place] area. Patient processed feeling pressure and frustration with lack of progress in completing course work to complete his college degree/ deadline is October.    Patient processed feeling rejection from group of friends he recently reconnected with/ stating to him they feel he comes over to their house too much; and uses them to smoke their marijuana. Patient feels lonely and isolated.   Patient processed wanting to contact ex-girlfriend; but has not reached out to her because he feels that would only make him feel worse.    Patient reports he continues to work at [employer]. Patient reports recent incident of being pulled over by a police office with possession of marijuana' the officer gave him verbal warning and let me go.         Progress on Treatment Objective(s) / Homework:  New Objective established this session - ACTION (Actively working towards change); Intervened by reinforcing change plan / affirming steps taken     Cognitive Behavioral Therapy  -Behavior Activation wok sheet (college course work completion)   -Processed his decision making process with using marijuana and the impacts his use has on his relationships.         Motivational Interviewing    MI Intervention: Co-Developed Goal: Financial independnece, own housing, work/ college degree, manage depression and anxiety, Expressed Empathy/Understanding, Open-ended questions and Reflections: simple and complex     Change Talk Expressed by the Patient: Desire to change Ability to change Reasons to change Need to change    Provider Response to Change Talk: E - Evoked more info from patient " "about behavior change, A - Affirmed patient's thoughts, decisions, or attempts at behavior change, R - Reflected patient's change talk and S - Summarized patient's change talk statements    Also provided psychoeducation about behavioral health condition, symptoms, and treatment options    Care Plan review completed: Yes    Medication Review:  No changes to current psychiatric medication(s)    Medication Compliance:  Yes    Changes in Health Issues:   None reported    Chemical Use Review:   Substance Use: Chemical use reviewed, no active concerns identified      Tobacco Use: No current tobacco use.      Assessment: Current Emotional / Mental Status (status of significant symptoms):  Risk status (Self / Other harm or suicidal ideation)  Patient denies a history of suicidal ideation, suicide attempts, self-injurious behavior, homicidal ideation, homicidal behavior and and other safety concerns  Patient denies current fears or concerns for personal safety.  Patient reports the following current or recent suicidal ideation or behaviors: passive thoughts, No plan, no intent to take action, no history of attempts. SI related to recent break up with girlfriend \"I don't want to be around\".  Patient denies current or recent homicidal ideation or behaviors.  Patient denies current or recent self injurious behavior or ideation.  Patient denies other safety concerns.  A safety and risk management plan has not been developed at this time, however patient was encouraged to call [place] [phone] should there be a change in any of these risk factors.    Appearance:   Appropriate   Eye Contact:   Good   Psychomotor Behavior: Normal   Attitude:   Cooperative   Orientation:   All  Speech   Rate / Production: Normal    Volume:  Normal   Mood:    Anxious  Depressed   Affect:    Worrisome   Thought Content:  Rumination   Thought Form:  Coherent  Logical   Insight:    Good     Diagnoses:  1. Major depressive disorder, single episode, " moderate (H)    2. Generalized anxiety disorder        Collateral Reports Completed:  Not Applicable    Plan: (Homework, other):  Patient was given information about behavioral services and encouraged to schedule a follow up appointment with the clinic [facility] 8/22/19, ongoing care/transfer to [facility].  He was also given Cognitive Behavioral Therapy skills to practice when experiencing anxiety and depression.  CD Recommendations: No indications of CD issues.  GOSIA Veras, [facility]

## 2019-08-22 ENCOUNTER — OFFICE VISIT (OUTPATIENT)
Dept: BEHAVIORAL HEALTH | Facility: CLINIC | Age: 24
End: 2019-08-22
Payer: COMMERCIAL

## 2019-08-22 DIAGNOSIS — F41.1 GENERALIZED ANXIETY DISORDER: ICD-10-CM

## 2019-08-22 DIAGNOSIS — F32.1 MAJOR DEPRESSIVE DISORDER, SINGLE EPISODE, MODERATE (H): Primary | ICD-10-CM

## 2019-08-22 PROCEDURE — 90832 PSYTX W PT 30 MINUTES: CPT | Performed by: MARRIAGE & FAMILY THERAPIST

## 2019-08-22 NOTE — PROGRESS NOTES
[facility] [place]   August 22, 2019      Behavioral Health Clinician Progress Note    Patient Name: Curly Leslie           Service Type:  Individual      Service Location:   Face to Face in Clinic     Session Start Time: 12:00  Session End Time: 12:30      Session Length: 16 - 37      Attendees: Patient    Visit Activities (Refresh list every visit): [facility] Only    Diagnostic Assessment Date: 7/11/19  Treatment Plan Review Date: To be completed   See Flowsheets for today's PHQ-9 and ANN-7 results  Previous PHQ-9:   PHQ-9 SCORE 6/24/2019 7/22/2019   PHQ-9 Total Score 16 17     Previous ANN-7:   ANN-7 SCORE 6/24/2019 7/22/2019   Total Score 17 18       MÓNICA LEVEL:  MÓNICA Score (Last Two) 6/24/2019 7/22/2019   MÓNICA Raw Score 40 31   Activation Score 100 59.3   MÓNICA Level 4 3       DATA  Extended Session (60+ minutes): No  Interactive Complexity: No  Crisis: No  [facility] Patient: No    Treatment Objective(s) Addressed in This Session:  Target Behavior(s): disease management/lifestyle changes related to anxiety and depression    Depressed Mood: Increase interest, engagement, and pleasure in doing things  Decrease frequency and intensity of feeling down, depressed, hopeless  Improve quantity and quality of night time sleep / decrease daytime naps  Feel less tired and more energy during the day   Improve diet, appetite, mindful eating, and / or meal planning  Identify negative self-talk and behaviors: challenge core beliefs, myths, and actions  Improve concentration, focus, and mindfulness in daily activities   Feel less fidgety, restless or slow in daily activities / interpersonal interactions  Decrease thoughts that you'd be better off dead or of suicide / self-harm  Anxiety: will experience a reduction in anxiety, will develop more effective coping skills to manage anxiety symptoms, will develop healthy cognitive patterns and beliefs and will increase ability to function adaptively    Current Stressors /  Issues:  Patient reports completed some course work for completing his degree.   Patient processed continued increased symptoms of depression and anxiety.  Patient processed feelings of isolation and rejection; not having adequate social/friendhsip relationships.   Patient has not used cannabis for 1 week.     Progress on Treatment Objective(s) / Homework:  New Objective established this session - ACTION (Actively working towards change); Intervened by reinforcing change plan / affirming steps taken     Cognitive Behavioral Therapy  -Behavior Activation wok sheet (college course work completion)   -Resources and Referrals for services:   1) [facility] referral for psychiatry, individual therapy and group therapy    2) [facility] for [facility]         Motivational Interviewing    MI Intervention: Co-Developed Goal: Financial independnece, own housing, work/ college degree, manage depression and anxiety, Expressed Empathy/Understanding, Open-ended questions and Reflections: simple and complex     Change Talk Expressed by the Patient: Desire to change Ability to change Reasons to change Need to change    Provider Response to Change Talk: E - Evoked more info from patient about behavior change, A - Affirmed patient's thoughts, decisions, or attempts at behavior change, R - Reflected patient's change talk and S - Summarized patient's change talk statements    Also provided psychoeducation about behavioral health condition, symptoms, and treatment options    Care Plan review completed: Yes    Medication Review:  No changes to current psychiatric medication(s)    Medication Compliance:  Yes    Changes in Health Issues:   None reported    Chemical Use Review:   Substance Use: Chemical use reviewed, no active concerns identified      Tobacco Use: No current tobacco use.      Assessment: Current Emotional / Mental Status (status of significant symptoms):  Risk status (Self / Other harm or suicidal ideation)  Patient  "denies a history of suicidal ideation, suicide attempts, self-injurious behavior, homicidal ideation, homicidal behavior and and other safety concerns  Patient denies current fears or concerns for personal safety.  Patient reports the following current or recent suicidal ideation or behaviors: passive thoughts, No plan, no intent to take action, no history of attempts. SI related to recent break up with girlfriend \"I don't want to be around\".  Patient denies current or recent homicidal ideation or behaviors.  Patient denies current or recent self injurious behavior or ideation.  Patient denies other safety concerns.  A safety and risk management plan has not been developed at this time, however patient was encouraged to call [place] [phone] should there be a change in any of these risk factors.    Appearance:   Appropriate   Eye Contact:   Good   Psychomotor Behavior: Normal   Attitude:   Cooperative   Orientation:   All  Speech   Rate / Production: Normal    Volume:  Normal   Mood:    Anxious  Depressed   Affect:    Worrisome   Thought Content:  Rumination   Thought Form:  Coherent  Logical   Insight:    Good     Diagnoses:  1. Major depressive disorder, single episode, moderate (H)    2. Generalized anxiety disorder        Collateral Reports Completed:  Not Applicable    Plan: (Homework, other):  Patient was given information about behavioral services and encouraged to schedule a follow up appointment with the clinic [facility] 8/22/19, ongoing care/transfer to [facility].  He was also given Cognitive Behavioral Therapy skills to practice when experiencing anxiety and depression.  CD Recommendations: No indications of CD issues.  GOSIA Veras, [facility]  "

## 2019-10-02 PROBLEM — F32.9 MDD (MAJOR DEPRESSIVE DISORDER): Status: ACTIVE | Noted: 2019-10-02

## 2020-02-24 ENCOUNTER — HEALTH MAINTENANCE LETTER (OUTPATIENT)
Age: 25
End: 2020-02-24

## 2020-12-13 ENCOUNTER — HEALTH MAINTENANCE LETTER (OUTPATIENT)
Age: 25
End: 2020-12-13

## 2021-04-17 ENCOUNTER — HEALTH MAINTENANCE LETTER (OUTPATIENT)
Age: 26
End: 2021-04-17

## 2021-09-26 ENCOUNTER — HEALTH MAINTENANCE LETTER (OUTPATIENT)
Age: 26
End: 2021-09-26

## 2022-05-08 ENCOUNTER — HEALTH MAINTENANCE LETTER (OUTPATIENT)
Age: 27
End: 2022-05-08

## 2023-04-23 ENCOUNTER — HEALTH MAINTENANCE LETTER (OUTPATIENT)
Age: 28
End: 2023-04-23

## 2023-06-02 ENCOUNTER — HEALTH MAINTENANCE LETTER (OUTPATIENT)
Age: 28
End: 2023-06-02

## 2023-09-17 ASSESSMENT — PATIENT HEALTH QUESTIONNAIRE - PHQ9: SUM OF ALL RESPONSES TO PHQ QUESTIONS 1-9: 16
